# Patient Record
Sex: MALE | Race: ASIAN | NOT HISPANIC OR LATINO | URBAN - METROPOLITAN AREA
[De-identification: names, ages, dates, MRNs, and addresses within clinical notes are randomized per-mention and may not be internally consistent; named-entity substitution may affect disease eponyms.]

---

## 2024-09-15 ENCOUNTER — EMERGENCY (EMERGENCY)
Facility: HOSPITAL | Age: 24
LOS: 0 days | Discharge: HOME | End: 2024-09-15
Attending: EMERGENCY MEDICINE
Payer: SELF-PAY

## 2024-09-15 VITALS
TEMPERATURE: 98 F | HEIGHT: 66 IN | DIASTOLIC BLOOD PRESSURE: 82 MMHG | WEIGHT: 160.06 LBS | OXYGEN SATURATION: 97 % | HEART RATE: 101 BPM | SYSTOLIC BLOOD PRESSURE: 132 MMHG | RESPIRATION RATE: 18 BRPM

## 2024-09-15 DIAGNOSIS — S62.336A DISPLACED FRACTURE OF NECK OF FIFTH METACARPAL BONE, RIGHT HAND, INITIAL ENCOUNTER FOR CLOSED FRACTURE: ICD-10-CM
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
## 2024-09-15 PROCEDURE — 99283 EMERGENCY DEPT VISIT LOW MDM: CPT

## 2024-09-15 NOTE — ED PROVIDER NOTE - PATIENT PORTAL LINK FT
You can access the FollowMyHealth Patient Portal offered by [facility] by registering at the following website: [email or web address]. By joining [employer]’s FollowMyHealth portal, you will also be able to view your health information using other applications (apps) compatible with our system.

## 2024-09-15 NOTE — ED PROVIDER NOTE - NSFOLLOWUPCLINICS_GEN_ALL_ED_FT
[facility]  Hand Surgery  [address]  [place] [address]  Phone: [phone]  Fax:   Follow Up Time: Urgent

## 2024-09-15 NOTE — ED PROVIDER NOTE - CLINICAL SUMMARY MEDICAL DECISION MAKING FREE TEXT BOX
25 yo M with R boxer's fracture sustained 5 weeks prior, casted, no complaints  -refers to outpt. hand sx for f/u; no indication for cast removal at this time, or rpt XRs

## 2024-09-15 NOTE — ED PROVIDER NOTE - PHYSICAL EXAMINATION
Vitals: WNL  Gen: AAOx3, NAD, sitting comfortably in stretcher  Head: ncat, perrla, eomi b/l  Neck: supple, no lymphadenopathy, no midline deviation  Heart: rrr, no m/r/g  Lungs: CTA b/l, no rales/ronchi/wheezes  Abd: soft, nontender, non-distended, no rebound or guarding  Ext: no clubbing/cyanosis/edema, R hand has short arm cast, fingers are mobile with normal color and cap refill, sensation intact in all digits, no numbness or weakness, no swelling of digits   Neuro: sensation and muscle strength intact b/l, steady gait

## 2024-09-15 NOTE — ED PROVIDER NOTE - OBJECTIVE STATEMENT
25 yo M [place]  Pt. denies complaints, but asks for cast to be removed and XR's to be performed in ER.  Pt. admits cast was supposed to be removed in 7 weeks however.  Pt. has no acute complaints with regard to hand--no pain, no numbness, no weakness.  No reinjury.    ROS: negative for fever, cough, headache, chest pain, shortness of breath, abd pain, nausea, vomiting, diarrhea, rash, paresthesia, and weakness--all other systems reviewed are negative.   PMH: negative; Meds: Denies; SH: Denies smoking/drinking/drug use

## 2024-09-15 NOTE — ED ADULT NURSE NOTE - NSFALLUNIVINTERV_ED_ALL_ED
Bed/Stretcher in lowest position, wheels locked, appropriate side rails in place/Call bell, personal items and telephone in reach/Instruct patient to call for assistance before getting out of bed/chair/stretcher/Non-slip footwear applied when patient is off stretcher/[place] to call system/Physically safe environment - no spills, clutter or unnecessary equipment/Purposeful proactive rounding/Room/bathroom lighting operational, light cord in reach

## 2024-09-15 NOTE — ED ADULT NURSE NOTE - NS_SISCREENINGSR_GEN_ALL_ED
March 27, 2023       Eva Bright YOB: 1968   James Mcdaniels Date of Visit:  3/27/2023       To Whom It May Concern: It is my medical opinion that Mat Mason may return to work on 3/30/2023. If you have any questions or concerns, please don't hesitate to call.     Sincerely,        Ralph Roberson, ANIYAH - CNP Negative

## 2024-09-15 NOTE — ED ADULT NURSE NOTE - OBJECTIVE STATEMENT
Pt AOx4, ambulatory w/ steady gait. Pt presents to ED w/ c/o pain 3/10 in R 5th finger. Pt states pain started today, denies inciting event. R hand currently wrapped in a cast d/t R 5th finger broken 5 weeks ago. Pt denies numbness/tingling in R hand. Cap refill in R fingers less than 2 secs. As per pt, denies pertinent PMH.

## 2024-09-15 NOTE — ED ADULT TRIAGE NOTE - CHIEF COMPLAINT QUOTE
Patient c/o pain the 5th finger on the right hand x 2 days. Patient has the right hand in a cast s/p hand fracture 5 weeks ago.

## 2024-09-15 NOTE — ED PROVIDER NOTE - CARE PROVIDER_API CALL
Montrell Castellanos  Emergency Medicine  [address] [place] [id number]  Follow Up Time: Urgent

## 2024-09-26 ENCOUNTER — EMERGENCY (EMERGENCY)
Facility: HOSPITAL | Age: 24
LOS: 1 days | Discharge: HOME | End: 2024-09-26
Admitting: EMERGENCY MEDICINE
Payer: SELF-PAY

## 2024-09-26 VITALS
HEART RATE: 82 BPM | WEIGHT: 179.9 LBS | RESPIRATION RATE: 16 BRPM | HEIGHT: 66 IN | OXYGEN SATURATION: 97 % | SYSTOLIC BLOOD PRESSURE: 144 MMHG | TEMPERATURE: 98 F | DIASTOLIC BLOOD PRESSURE: 77 MMHG

## 2024-09-26 VITALS
TEMPERATURE: 98 F | RESPIRATION RATE: 16 BRPM | DIASTOLIC BLOOD PRESSURE: 84 MMHG | HEART RATE: 77 BPM | OXYGEN SATURATION: 98 % | SYSTOLIC BLOOD PRESSURE: 147 MMHG

## 2024-09-26 PROCEDURE — 73130 X-RAY EXAM OF HAND: CPT | Mod: 26,RT

## 2024-09-26 PROCEDURE — 99283 EMERGENCY DEPT VISIT LOW MDM: CPT

## 2024-09-26 PROCEDURE — 99053 MED SERV 10PM-8AM 24 HR FAC: CPT

## 2024-10-20 ENCOUNTER — EMERGENCY (EMERGENCY)
Facility: HOSPITAL | Age: 24
LOS: 0 days | Discharge: HOME | End: 2024-10-20
Attending: STUDENT IN AN ORGANIZED HEALTH CARE EDUCATION/TRAINING PROGRAM
Payer: SELF-PAY

## 2024-10-20 VITALS
HEART RATE: 79 BPM | RESPIRATION RATE: 18 BRPM | SYSTOLIC BLOOD PRESSURE: 130 MMHG | OXYGEN SATURATION: 97 % | WEIGHT: 175.05 LBS | DIASTOLIC BLOOD PRESSURE: 85 MMHG | HEIGHT: 66 IN | TEMPERATURE: 98 F

## 2024-10-20 VITALS
HEART RATE: 67 BPM | TEMPERATURE: 98 F | RESPIRATION RATE: 19 BRPM | DIASTOLIC BLOOD PRESSURE: 80 MMHG | SYSTOLIC BLOOD PRESSURE: 126 MMHG | OXYGEN SATURATION: 98 %

## 2024-10-20 DIAGNOSIS — M79.641 PAIN IN RIGHT HAND: ICD-10-CM

## 2024-10-20 DIAGNOSIS — S62.91XD UNSPECIFIED FRACTURE OF RIGHT WRIST AND HAND, SUBSEQUENT ENCOUNTER FOR FRACTURE WITH ROUTINE HEALING: ICD-10-CM

## 2024-10-20 PROCEDURE — 99053 MED SERV 10PM-8AM 24 HR FAC: CPT

## 2024-10-20 PROCEDURE — 99283 EMERGENCY DEPT VISIT LOW MDM: CPT
